# Patient Record
Sex: FEMALE | Race: WHITE | NOT HISPANIC OR LATINO | URBAN - METROPOLITAN AREA
[De-identification: names, ages, dates, MRNs, and addresses within clinical notes are randomized per-mention and may not be internally consistent; named-entity substitution may affect disease eponyms.]

---

## 2020-07-28 ENCOUNTER — APPOINTMENT (RX ONLY)
Dept: URBAN - METROPOLITAN AREA CLINIC 15 | Facility: CLINIC | Age: 30
Setting detail: DERMATOLOGY
End: 2020-07-28

## 2020-07-28 DIAGNOSIS — Z41.9 ENCOUNTER FOR PROCEDURE FOR PURPOSES OTHER THAN REMEDYING HEALTH STATE, UNSPECIFIED: ICD-10-CM

## 2020-07-28 PROCEDURE — ? DYSPORT

## 2020-07-28 PROCEDURE — ? MEDICAL CONSULTATION: DYNAMIC RHYTIDES

## 2020-07-28 PROCEDURE — ? FILLERS

## 2020-07-28 PROCEDURE — ? MEDICAL CONSULTATION: FILLERS

## 2020-07-28 ASSESSMENT — LOCATION SIMPLE DESCRIPTION DERM
LOCATION SIMPLE: LEFT CHEEK
LOCATION SIMPLE: RIGHT CHEEK
LOCATION SIMPLE: LEFT LIP
LOCATION SIMPLE: RIGHT LIP

## 2020-07-28 ASSESSMENT — LOCATION DETAILED DESCRIPTION DERM
LOCATION DETAILED: RIGHT SUPERIOR CENTRAL MALAR CHEEK
LOCATION DETAILED: LEFT CENTRAL MALAR CHEEK
LOCATION DETAILED: LEFT INFERIOR VERMILION LIP
LOCATION DETAILED: RIGHT CENTRAL MALAR CHEEK
LOCATION DETAILED: RIGHT SUPERIOR VERMILION LIP

## 2020-07-28 ASSESSMENT — LOCATION ZONE DERM
LOCATION ZONE: LIP
LOCATION ZONE: FACE

## 2020-07-28 NOTE — PROCEDURE: FILLERS
Vermilion Lips Filler Volume In Cc: 0
Additional Area 2 Location: glabella
Filler: Restylane Kysse
Include Cannula Brand?: [employer]
Lot #: [id number]
Additional Area 3 Location: chin
Price (Use Numbers Only, No Special Characters Or $): 0048 [employer]
Include Cannula Size?: 25G
Expiration Date (Month Year): 2021-03-21
Use Map Statement For Sites (Optional): No
Include Cannula Information In Note?: Yes
Include Cannula Length?: 1.5 inch
Lot #: [id number]
Vermilion Lips Filler Volume In Cc: 1
Anesthesia Type: 1% lidocaine with epinephrine
Map Statment: See [address] for Complete Details
Lot #: [id number]
Additional Area 1 Location: lips
Anesthesia Volume In Cc: 0.5
Include Cannula Length?: 1 inch
Additional Anesthesia Volume In Cc: 6
Post-Care Instructions: Patient instructed to apply ice to reduce swelling.
Additional Area 4 Location: neck
Consent: Written consent obtained. Risks include but not limited to bruising, beading, irregular texture, ulceration, infection, allergic reaction, scar formation, incomplete augmentation, temporary nature, procedural pain.
Expiration Date (Month Year): 2021-04-17
Additional Area 1 Location: GL
Detail Level: Detailed
Lot #: [id number]
Price (Use Numbers Only, No Special Characters Or $): Monique
Filler: Wolf Monteiro
Map Statment: See Attach Map for Complete Details
Cheeks Filler Volume In Cc: 2
Lot #: [id number]
Lot #: [id number]

## 2020-08-07 ENCOUNTER — APPOINTMENT (RX ONLY)
Dept: URBAN - METROPOLITAN AREA CLINIC 15 | Facility: CLINIC | Age: 30
Setting detail: DERMATOLOGY
End: 2020-08-07

## 2020-08-07 DIAGNOSIS — Z41.9 ENCOUNTER FOR PROCEDURE FOR PURPOSES OTHER THAN REMEDYING HEALTH STATE, UNSPECIFIED: ICD-10-CM

## 2020-08-07 PROCEDURE — ? COSMETIC FOLLOW-UP

## 2020-08-07 PROCEDURE — ? IPL COSMETIC

## 2020-08-07 ASSESSMENT — LOCATION DETAILED DESCRIPTION DERM
LOCATION DETAILED: LEFT INFERIOR CENTRAL MALAR CHEEK
LOCATION DETAILED: RIGHT INFERIOR MEDIAL MALAR CHEEK

## 2020-08-07 ASSESSMENT — LOCATION ZONE DERM: LOCATION ZONE: FACE

## 2020-08-07 ASSESSMENT — LOCATION SIMPLE DESCRIPTION DERM
LOCATION SIMPLE: LEFT CHEEK
LOCATION SIMPLE: RIGHT CHEEK

## 2020-08-07 NOTE — PROCEDURE: IPL COSMETIC
Fluence (Include Units): 99[address] [facility]
Pulse Energy (Include Units): 5727 [facility]
Coolin
Detail Level: Zone
End-Point And Post-Procedure Care: The procedure continued until mild purpura was noted. Immediately following the procedure, Vaseline and ice applied. Post care reviewed with patient.
Consent: Prior to the procedure consent obtained, risks reviewed including but not limited to crusting, scabbing, blistering, scarring, darker or lighter pigmentary change, incidental hair removal, bruising, and/or incomplete removal.
Beam Overlap/Density (Include Units): 30%
Number Of Passes: 2
Eye Protection: Laser Aid
Indication: PIE
Pre-Procedure Care: Prior to the procedure the patient and all present had protective eyewear in place and a warning sign was placed on the door.
Number Of Passes: 1
Post-Care Instructions: I reviewed with the patient in detail post-care instructions. Patient should stay away from the sun and wear sun protection until treated areas are fully healed.
Show Price In Visit Note: No
Pulse Duration (Include Units): 10ms
Price (Use Numbers Only, No Special Characters Or $): 333 [facility]
Fluence (Include Units): 32
Pulse Duration (Include Units): 10
Ipl Type: Justin Savage

## 2020-08-07 NOTE — PROCEDURE: COSMETIC FOLLOW-UP
Patient/Caregiver provided printed discharge information.
Global Improvement: Very Good
Side Effects Or Complications: None
Patient Satisfaction: Very pleased
Detail Level: Zone
Treatment (Optional): Dysport

## 2021-02-01 NOTE — PROCEDURE: DYSPORT
Dilution (U/ 0.1cc): 1
R Brow Units: 0
Show Anterior Platysmal Band Units: Yes
anemia
Show Ucl Units: No
Post-Care Instructions: Patient instructed to not lie down for 4 hours and limit physical activity for 24 hours. Patient instructed not to travel by airplane for 48 hours.
Price (Use Numbers Only, No Special Characters Or $): 333 [facility]
Additional Area 3 Location: bunny lines
Glabellar Complex Units: 6216 [facility]
Abnormal EKG
Detail Level: Detailed
Periorbital Skin Units: 20
Additional Area 2 Location: chin
Lot #: [id number]
Consent: Written consent obtained. Risks include but not limited to lid/brow ptosis, bruising, swelling, diplopia, temporary effect, incomplete chemical denervation.
Expiration Date (Month Year): 11/30/2020
Forehead Units: 4
Additional Area 1 Location: Left upper lip

## 2022-02-18 ENCOUNTER — APPOINTMENT (RX ONLY)
Dept: URBAN - METROPOLITAN AREA CLINIC 15 | Facility: CLINIC | Age: 32
Setting detail: DERMATOLOGY
End: 2022-02-18

## 2022-02-18 DIAGNOSIS — Z41.9 ENCOUNTER FOR PROCEDURE FOR PURPOSES OTHER THAN REMEDYING HEALTH STATE, UNSPECIFIED: ICD-10-CM

## 2022-02-18 PROCEDURE — ? MEDICAL CONSULTATION: FRACTIONAL RESURFACING

## 2022-02-18 PROCEDURE — ? ADDITIONAL NOTES

## 2022-02-18 PROCEDURE — ? FRAXEL

## 2022-02-18 ASSESSMENT — LOCATION ZONE DERM: LOCATION ZONE: FACE

## 2022-02-18 ASSESSMENT — LOCATION DETAILED DESCRIPTION DERM
LOCATION DETAILED: RIGHT CENTRAL MALAR CHEEK
LOCATION DETAILED: SUPERIOR MID FOREHEAD
LOCATION DETAILED: LEFT CHIN
LOCATION DETAILED: LEFT INFERIOR CENTRAL MALAR CHEEK

## 2022-02-18 ASSESSMENT — LOCATION SIMPLE DESCRIPTION DERM
LOCATION SIMPLE: LEFT CHEEK
LOCATION SIMPLE: CHIN
LOCATION SIMPLE: SUPERIOR FOREHEAD
LOCATION SIMPLE: RIGHT CHEEK

## 2022-02-18 NOTE — PROCEDURE: ADDITIONAL NOTES
Detail Level: Detailed
Render Risk Assessment In Note?: no
Additional Notes: Recommend Alastin skin nectar and hydroquinone 8% cream nightly

## 2022-02-18 NOTE — PROCEDURE: FRAXEL
Was An Eye Shield Used?: No
External Cooling: SmartCool
Treatment Number: 1
External Cooling Fan Speed: 5
Topical Anesthesia Type: BLT cream (benzocaine 20%, lidocaine 6%, tetracaine 4%)
Small Metal Eye Shield Text: The ocular mucosa was anesthetized with tetracaine. Once adequate anesthesia was optained, small metal eye shields were inserted and remained in place until the procedure was completed.
Medium Metal Eye Shield Text: The ocular mucosa was anesthetized with tetracaine. Once adequate anesthesia was optained, medium metal eye shields were inserted and remained in place until the procedure was completed.
Price (Use Numbers Only, No Special Characters Or $): Brendan Campuzano
Wavelength: 1550nm
Location: Use Location Override
Indication: resurfacing
Detail Level: Zone
Large Metal Eye Shield Text: The ocular mucosa was anesthetized with tetracaine. Once adequate anesthesia was optained, large metal eye shields were inserted and remained in place until the procedure was completed.
Small Plastic Eye Shield Text: The ocular mucosa was anesthetized with tetracaine. Once adequate anesthesia was optained, small plastic eye shields were inserted and remained in place until the procedure was completed.
Medium Plastic Eye Shield Text: The ocular mucosa was anesthetized with tetracaine. Once adequate anesthesia was optained, medium plastic eye shields were inserted and remained in place until the procedure was completed.
Large Plastic Eye Shield Text: The ocular mucosa was anesthetized with tetracaine. Once adequate anesthesia was optained, large plastic eye shields were inserted and remained in place until the procedure was completed.
Total Coverage: 20%
Number Of Passes: 2
Depth In Microns (Use Numbers Only, No Special Characters Or $): [address]
Location: lower face
Energy(Mj/Cm2): [address] [facility]
Post-Care Instructions: I reviewed with the patient in detail post-care instructions. Patient should avoid sun until area fully healed. \\nRecommend Alastin Skin Nectar BID and Mineral Sunblock until next procedure
Total Coverage: 17%
Treatment Level: 6
Energy(Mj/Cm2): 35
Energy(Mj/Cm2): [address]
Location: full face
Treatment Level: 8
Depth In Microns (Use Numbers Only, No Special Characters Or $): 2189
Treatment Level: 7
Pre-Procedure Text (Will Appear After Anesthesia Text): Discussed tx and post procedure recovery.  Quote given
Consent: Written consent obtained, risks reviewed including but not limited to pain and incomplete improvement.

## 2023-01-24 ENCOUNTER — APPOINTMENT (RX ONLY)
Dept: URBAN - METROPOLITAN AREA CLINIC 15 | Facility: CLINIC | Age: 33
Setting detail: DERMATOLOGY
End: 2023-01-24

## 2023-01-24 DIAGNOSIS — Z41.9 ENCOUNTER FOR PROCEDURE FOR PURPOSES OTHER THAN REMEDYING HEALTH STATE, UNSPECIFIED: ICD-10-CM

## 2023-01-24 PROCEDURE — ? PRESCRIPTION

## 2023-01-24 PROCEDURE — ? RECOMMENDATIONS

## 2023-01-24 PROCEDURE — ? PICOWAY LASER

## 2023-01-24 RX ORDER — PHARMACY COMPOUNDING ACCESSORY
EACH MISCELLANEOUS QHS
Qty: 30 | Refills: 0 | Status: CANCELLED | COMMUNITY
Start: 2023-01-24

## 2023-01-24 RX ORDER — TRETIONIN 0.25 MG/G
1 CREAM TOPICAL
Qty: 20 | Refills: 3 | Status: ERX | COMMUNITY
Start: 2023-01-24

## 2023-01-24 RX ADMIN — TRETIONIN 1: 0.25 CREAM TOPICAL at 00:00

## 2023-01-24 RX ADMIN — Medication: at 00:00

## 2023-01-24 NOTE — PROCEDURE: PICOWAY LASER
Detail Level: Simple
Handpiece: Resolve
Frequency (Hz): 8Hz
Spot Size: 6.0 mm
Wavelength: 532 nm
Treatment Number: 0
Fluence (J/Cm2): 2
Pulse Duration: 2.5 ms
Fluence (J/Cm2): 0.6
Consent: Written consent obtained, risks reviewed including but not limited to crusting, scabbing, blistering, scarring, darker or lighter pigmentary change, paradoxical hair regrowth, incomplete removal of hair and infection.
Device Serial Number (Optional): [id number]
Pre-Procedure: Prior to proceeding the treatment areas were cleaned and all present put on their eye protection.
Topical Anesthesia Type: BLT ointment (benzocaine 20%, lidocaine 10%, tetracaine 4%)
Length Of Topical Anesthesia Application (Optional): 60 minutes
Spot Size: 4.0 mm
Handpiece: Zoom
Fluence (J/Cm2): 0.65
Post-Care Instructions: I reviewed with the patient in detail post-care instructions. Patient should avoid sun for a minimum of 4 weeks before and after treatment.
Frequency (Hz): 6Hz
Price (Use Numbers Only, No Special Characters Or $): [address] [facility],[address]
Hide Pulse Duration?: No
Spot Size: 2.0 mm
Post-Procedure Care: Immediate endpoint: perifollicular erythema and edema. ice applied. Post care reviewed with patient.
Treatment Number: 1
Fluence (J/Cm2): 1.1
Spot Size: 8.0 mm
Wavelength: 1064 nm

## 2023-01-24 NOTE — PROCEDURE: RECOMMENDATIONS
Render Risk Assessment In Note?: no
Recommendations (Free Text): Am\\nWash ace with gentle cleanser\\nApply vitamin C \\nApply even tone correction serum. In office product from [employer] \\nApply moisturizer \\nApply sunscreen \\nPm\\nWash\\nApply compound cream 8%HQ. [facility] \\nApply Tretinoin 0.025% cream . Start 3 x a week. Increase as tolerated.  Rx sent \\nApply moisturizer
Detail Level: Zone

## 2023-01-24 NOTE — HPI: COSMETIC CONSULTATION
Additional History: Patient was consulted last year and was recommended IPL treatments.  She wants to reevaluate the area for melasma

## 2023-01-26 ENCOUNTER — RX ONLY (OUTPATIENT)
Age: 33
Setting detail: RX ONLY
End: 2023-01-26

## 2023-01-26 RX ORDER — PHARMACY COMPOUNDING ACCESSORY
1 EACH MISCELLANEOUS QHS
Qty: 60 | Refills: 0 | Status: ERX | COMMUNITY
Start: 2023-01-26

## 2023-03-02 ENCOUNTER — APPOINTMENT (RX ONLY)
Dept: URBAN - METROPOLITAN AREA CLINIC 15 | Facility: CLINIC | Age: 33
Setting detail: DERMATOLOGY
End: 2023-03-02

## 2023-03-02 DIAGNOSIS — Z41.9 ENCOUNTER FOR PROCEDURE FOR PURPOSES OTHER THAN REMEDYING HEALTH STATE, UNSPECIFIED: ICD-10-CM

## 2023-03-02 PROCEDURE — ? RECOMMENDATIONS

## 2023-03-02 PROCEDURE — ? PICOWAY LASER

## 2023-03-02 NOTE — PROCEDURE: PICOWAY LASER
Detail Level: Simple
Handpiece: Resolve
Frequency (Hz): 8Hz
Passes: 2
Spot Size: 6.0 mm
Wavelength: 532 nm
Treatment Number: 0
Pulse Duration: 2.5 ms
Fluence (J/Cm2): 0.4
Consent: Written consent obtained, risks reviewed including but not limited to crusting, scabbing, blistering, scarring, darker or lighter pigmentary change, paradoxical hair regrowth, incomplete removal of hair and infection.
Device Serial Number (Optional): [id number]
Wavelength: 1064 nm
Pre-Procedure: Prior to proceeding the treatment areas were cleaned and all present put on their eye protection.
Topical Anesthesia Type: BLT ointment (benzocaine 20%, lidocaine 10%, tetracaine 4%)
Length Of Topical Anesthesia Application (Optional): 60 minutes
Spot Size: 6.0 mm x 6.0 mm
Handpiece: Zoom
Fluence (J/Cm2): 0.35
Handpiece: Fusion
Post-Care Instructions: I reviewed with the patient in detail post-care instructions. Patient should avoid sun for a minimum of 4 weeks before and after treatment.
Frequency (Hz): 6Hz
Price (Use Numbers Only, No Special Characters Or $): 8[address] [facility],[address]
Hide Pulse Duration?: No
Spot Size: 2.0 mm
Post-Procedure Care: Immediate endpoint: perifollicular erythema and edema. ice applied. Post care reviewed with patient.
Fluence (J/Cm2): 1.3
Spot Size: 3.0 mm

## 2023-04-05 ENCOUNTER — APPOINTMENT (RX ONLY)
Dept: URBAN - METROPOLITAN AREA CLINIC 23 | Facility: CLINIC | Age: 33
Setting detail: DERMATOLOGY
End: 2023-04-05

## 2023-04-05 DIAGNOSIS — Z41.9 ENCOUNTER FOR PROCEDURE FOR PURPOSES OTHER THAN REMEDYING HEALTH STATE, UNSPECIFIED: ICD-10-CM

## 2023-04-05 PROCEDURE — ? RECOMMENDATIONS

## 2023-04-05 PROCEDURE — ? PULSED-DYE LASER

## 2023-04-05 NOTE — PROCEDURE: PULSED-DYE LASER
Detail Level: Zone
Fluence In J/Cm2 (Optional): 8
Price (Use Numbers Only, No Special Characters Or $): [address]
Delay Time (Ms): 9032 [facility]
Delay Time (Ms): 20
Post-Care Instructions: I reviewed with the patient in detail post-care instructions. Patient should stay away from the sun and wear sun protection until treated areas are fully healed.
Cryogen Time (Ms): [address] Derrell Napoles
Cryogen Time (Ms): 30
Spot Size: 7 mm
Spot Size: 10 mm
Location Override: face
Pulse Duration: 10 ms
Laser Type: Vbeam 595nm
Post-Procedure Care: Vaseline and ice applied. Post care reviewed with patient.
Consent: Written consent obtained, risks reviewed including but not limited to crusting, scabbing, blistering, scarring, darker or lighter pigmentary change, incidental hair removal, bruising, and/or incomplete removal.

## 2023-04-05 NOTE — PROCEDURE: RECOMMENDATIONS
Recommendations (Free Text): Recommended 3-5 sessions for redness from rosacea
Render Risk Assessment In Note?: no
Detail Level: Zone

## 2023-05-11 ENCOUNTER — APPOINTMENT (RX ONLY)
Dept: URBAN - METROPOLITAN AREA CLINIC 15 | Facility: CLINIC | Age: 33
Setting detail: DERMATOLOGY
End: 2023-05-11

## 2023-05-11 DIAGNOSIS — Z41.9 ENCOUNTER FOR PROCEDURE FOR PURPOSES OTHER THAN REMEDYING HEALTH STATE, UNSPECIFIED: ICD-10-CM

## 2023-05-11 PROCEDURE — ? PICOWAY LASER

## 2023-05-11 PROCEDURE — ? RECOMMENDATIONS

## 2023-05-11 NOTE — PROCEDURE: PICOWAY LASER
Detail Level: Simple
Handpiece: Resolve
Frequency (Hz): 8Hz
Passes: 2
Spot Size: 6.0 mm
Wavelength: 532 nm
Treatment Number: 0
Pulse Duration: 2.5 ms
Fluence (J/Cm2): 0.8
Consent: Written consent obtained, risks reviewed including but not limited to crusting, scabbing, blistering, scarring, darker or lighter pigmentary change, paradoxical hair regrowth, incomplete removal of hair and infection.
Device Serial Number (Optional): [id number]
Wavelength: 1064 nm
Treatment Number: 4
Pre-Procedure: Prior to proceeding the treatment areas were cleaned and all present put on their eye protection.
Topical Anesthesia Type: BLT ointment (benzocaine 20%, lidocaine 10%, tetracaine 4%)
Length Of Topical Anesthesia Application (Optional): 60 minutes
Spot Size: 6.0 mm x 6.0 mm
Handpiece: Zoom
Fluence (J/Cm2): 0.45
Handpiece: Fusion
Post-Care Instructions: I reviewed with the patient in detail post-care instructions. Patient should avoid sun for a minimum of 4 weeks before and after treatment.
Price (Use Numbers Only, No Special Characters Or $): [address] [facility],[address]
Hide Pulse Duration?: No
Spot Size: 2.0 mm
Post-Procedure Care: Immediate endpoint: perifollicular erythema and edema. ice applied. Post care reviewed with patient.
Fluence (J/Cm2): 1.9
Spot Size: 8.0 mm

## 2023-06-14 ENCOUNTER — APPOINTMENT (RX ONLY)
Dept: URBAN - METROPOLITAN AREA CLINIC 23 | Facility: CLINIC | Age: 33
Setting detail: DERMATOLOGY
End: 2023-06-14

## 2023-06-14 DIAGNOSIS — Z41.9 ENCOUNTER FOR PROCEDURE FOR PURPOSES OTHER THAN REMEDYING HEALTH STATE, UNSPECIFIED: ICD-10-CM

## 2023-06-14 PROCEDURE — ? PULSED-DYE LASER

## 2023-06-14 NOTE — PROCEDURE: PULSED-DYE LASER
Spot Size: 7 mm
Delay Time (Ms): 2508 [facility]
Delay Time (Ms): 20
Location (Required For Details To Render In Note But Body Touch Will Also Count For First Location): full face
Pulse Duration: 10 ms
Fluence In J/Cm2 (Optional): 8:00
Cryogen Time (Ms): [address] Derrell Napoles
Post-Procedure Care: Vaseline and ice applied. Post care reviewed with patient.
Price (Use Numbers Only, No Special Characters Or $): 350.00
Post-Care Instructions: I reviewed with the patient in detail post-care instructions. Patient should stay away from the sun and wear sun protection until treated areas are fully healed.
Consent: Written consent obtained, risks reviewed including but not limited to crusting, scabbing, blistering, scarring, darker or lighter pigmentary change, incidental hair removal, bruising, and/or incomplete removal.
Cryogen Time (Ms): 30
Fluence In J/Cm2 (Optional): 8
Detail Level: Zone
Cryogen Time (Ms): 10
Laser Type: Vbeam 595nm
Delay Time (Ms): 21
Spot Size: 10 mm

## 2023-07-28 ENCOUNTER — RX ONLY (OUTPATIENT)
Age: 33
Setting detail: RX ONLY
End: 2023-07-28

## 2023-07-28 ENCOUNTER — APPOINTMENT (RX ONLY)
Dept: URBAN - METROPOLITAN AREA CLINIC 23 | Facility: CLINIC | Age: 33
Setting detail: DERMATOLOGY
End: 2023-07-28

## 2023-07-28 DIAGNOSIS — Z41.9 ENCOUNTER FOR PROCEDURE FOR PURPOSES OTHER THAN REMEDYING HEALTH STATE, UNSPECIFIED: ICD-10-CM

## 2023-07-28 PROCEDURE — ? PULSED-DYE LASER

## 2023-07-28 RX ORDER — PHARMACY COMPOUNDING ACCESSORY
1 EACH MISCELLANEOUS QHS
Qty: 60 | Refills: 2 | Status: ERX

## 2023-07-28 NOTE — PROCEDURE: PULSED-DYE LASER
Fluence In J/Cm2 (Optional): 8.00
Location Override: full face
Pulse Duration: 10 ms
Pulse Duration: 20 ms
Cryogen Time (Ms): [address] Derrell Napoles
Cryogen Time (Ms): 30
Post-Procedure Care: Vaseline and ice applied. Post care reviewed with patient.
Consent: Written consent obtained, risks reviewed including but not limited to crusting, scabbing, blistering, scarring, darker or lighter pigmentary change, incidental hair removal, bruising, and/or incomplete removal.
Spot Size: 7 mm
Spot Size: 10 mm
Treated Area: small area
Delay Time (Ms): 0293 [facility]
Fluence In J/Cm2 (Optional): 9
Post-Care Instructions: I reviewed with the patient in detail post-care instructions. Patient should stay away from the sun and wear sun protection until treated areas are fully healed.
Detail Level: Detailed
Price (Use Numbers Only, No Special Characters Or $): 500.00
Delay Time (Ms): 20
Location Override: facial telangiectasias (nose, cheeks)
Cryogen Time (Ms): 27
Laser Type: Vbeam 595nm

## 2023-09-07 ENCOUNTER — APPOINTMENT (RX ONLY)
Dept: URBAN - METROPOLITAN AREA CLINIC 23 | Facility: CLINIC | Age: 33
Setting detail: DERMATOLOGY
End: 2023-09-07

## 2023-09-07 DIAGNOSIS — Z41.9 ENCOUNTER FOR PROCEDURE FOR PURPOSES OTHER THAN REMEDYING HEALTH STATE, UNSPECIFIED: ICD-10-CM

## 2023-09-07 PROCEDURE — ? PICOWAY LASER

## 2023-09-07 PROCEDURE — ? PULSED-DYE LASER

## 2023-09-07 NOTE — PROCEDURE: PICOWAY LASER
Spot Size: 6.0 mm
Fluence (J/Cm2): 2
Treatment Number: 3
Post-Care Instructions: I reviewed with the patient in detail post-care instructions. Patient should avoid sun for a minimum of 4 weeks before and after treatment.
Fluence (J/Cm2): 1.3
Post-Procedure Care: Immediate endpoint: mild erythema. Vaseline and ice applied. Post care reviewed with patient.
Pulse Duration: 2.5 ms
Location Override: lateral cheeks
Spot Size: 6.0 mm x 6.0 mm
Wavelength: 1064 nm
Treatment Number: 0
Detail Level: Detailed
Hide Pulse Duration?: Yes
Wavelength: 532 nm
Anesthesia Type: 1% lidocaine with epinephrine
Handpiece: Resolve
Fluence (J/Cm2): 0.8
Spot Size: 2.0 mm
Frequency (Hz): Zonia Smith
Fluence (J/Cm2): 0.4
Handpiece: Zoom
Frequency (Hz): Arianna Mosquera
Price (Use Numbers Only, No Special Characters Or $): 700.00
Pre-Procedure: Prior to proceeding the treatment areas were cleaned and all present put on their eye protection.
Consent: Written consent obtained, risks reviewed including but not limited to crusting, scabbing, blistering, scarring, darker or lighter pigmentary change, paradoxical hair regrowth, incomplete removal of hair and infection.
Frequency (Hz): 6Hz

## 2023-11-27 ENCOUNTER — APPOINTMENT (OUTPATIENT)
Dept: URBAN - METROPOLITAN AREA CLINIC 227 | Age: 33
Setting detail: DERMATOLOGY
End: 2023-11-28

## 2023-11-27 DIAGNOSIS — L71.8 OTHER ROSACEA: ICD-10-CM

## 2023-11-27 PROCEDURE — OTHER MIPS QUALITY: OTHER

## 2023-11-27 PROCEDURE — OTHER PULSED-DYE LASER: OTHER

## 2023-11-27 PROCEDURE — OTHER ADDITIONAL NOTES: OTHER

## 2023-11-27 PROCEDURE — OTHER COUNSELING: OTHER

## 2023-11-27 PROCEDURE — OTHER COSMETIC CONSULTATION - PULSED-DYE LASER: OTHER

## 2023-11-27 PROCEDURE — OTHER COSMETIC CONSULTATION: PULSED-DYE LASER: OTHER

## 2023-11-27 ASSESSMENT — LOCATION SIMPLE DESCRIPTION DERM
LOCATION SIMPLE: RIGHT CHEEK
LOCATION SIMPLE: LEFT CHEEK

## 2023-11-27 ASSESSMENT — LOCATION DETAILED DESCRIPTION DERM
LOCATION DETAILED: LEFT CENTRAL MALAR CHEEK
LOCATION DETAILED: RIGHT MEDIAL MALAR CHEEK

## 2023-11-27 ASSESSMENT — LOCATION ZONE DERM: LOCATION ZONE: FACE

## 2023-11-27 NOTE — PROCEDURE: MIPS QUALITY
Quality 226: Preventive Care And Screening: Tobacco Use: Screening And Cessation Intervention: Patient screened for tobacco use and is an ex/non-smoker
Quality 130: Documentation Of Current Medications In The Medical Record: Current Medications Documented
Quality 431: Preventive Care And Screening: Unhealthy Alcohol Use - Screening: Patient not identified as an unhealthy alcohol user when screened for unhealthy alcohol use using a systematic screening method
Quality 110: Preventive Care And Screening: Influenza Immunization: Influenza Immunization previously received during influenza season
Detail Level: Zone

## 2023-11-27 NOTE — PROCEDURE: PULSED-DYE LASER
Location (Required For Details To Render In Note But Body Touch Will Also Count For First Location): full face
Post-Procedure Care: Vaseline applied. Post care reviewed with patient.
Pulse Duration: 10 ms
Treated Area: large area
Cryogen Time (Ms): 30
Delay Time (Ms): 20
Spot Size: 7 mm
Fluence In J/Cm2 (Optional): 8.5
Post-Care Instructions: I reviewed with the patient in detail post-care instructions. Patient should apply ice to the face throughout the day and stay away from the sun.  Tomorrow it is okay to apply skin care products unless skin is irritated or sore.  Avoid scrubbing, exfoliation, retinoids until treated areas are fully healed.
Detail Level: Zone
Consent: Written consent obtained, risks reviewed including but not limited to swelling, bruising, redness, and rarely, scabbing, blistering, scarring,  darker or lighter pigmentary change, incidental hair removal, and/or incomplete removal.
Price (Use Numbers Only, No Special Characters Or $): 400
Laser Type: Vbeam 595nm
Immediate Endpoint: erythema

## 2023-11-27 NOTE — HPI: RASH (ROSACEA)
How Severe Is Your Rosacea?: moderate
Is This A New Presentation, Or A Follow-Up?: Rosacea
Additional History: Currently using compound of Metronidazole, Ivermectin and azelaic acid.\\n\\nSays she would like laser treatment done today.

## 2023-11-27 NOTE — PROCEDURE: ADDITIONAL NOTES
Additional Notes: Has had 2 treatments for Rosacea before with excellent results, and minimal bruising or swelling.
Detail Level: Simple
Render Risk Assessment In Note?: yes

## 2023-12-12 ENCOUNTER — APPOINTMENT (RX ONLY)
Dept: URBAN - METROPOLITAN AREA CLINIC 15 | Facility: CLINIC | Age: 33
Setting detail: DERMATOLOGY
End: 2023-12-12

## 2023-12-12 DIAGNOSIS — Z41.9 ENCOUNTER FOR PROCEDURE FOR PURPOSES OTHER THAN REMEDYING HEALTH STATE, UNSPECIFIED: ICD-10-CM

## 2023-12-12 PROCEDURE — ? PICOWAY LASER

## 2023-12-12 NOTE — PROCEDURE: PICOWAY LASER
Spot Size: 6.0 mm
Fluence (J/Cm2): 2
Treatment Number: 5
Post-Care Instructions: I reviewed with the patient in detail post-care instructions. Patient should avoid sun for a minimum of 4 weeks before and after treatment.
Fluence (J/Cm2): 1.5
Post-Procedure Care: Immediate endpoint: mild erythema. Vaseline and ice applied. Post care reviewed with patient.
Pulse Duration: 2.5 ms
Location Override: lateral cheeks
Spot Size: 6.0 mm x 6.0 mm
Wavelength: 1064 nm
Treatment Number: 0
Detail Level: Detailed
Hide Pulse Duration?: Yes
Wavelength: 532 nm
Anesthesia Type: 1% lidocaine with epinephrine
Handpiece: Resolve
Fluence (J/Cm2): 0.75
Spot Size: 2.0 mm
Frequency (Hz): 6hz
Fluence (J/Cm2): 0.4
Handpiece: Zoom
Frequency (Hz): 5hz
Price (Use Numbers Only, No Special Characters Or $): 700.00
Pre-Procedure: Prior to proceeding the treatment areas were cleaned and all present put on their eye protection.
Consent: Written consent obtained, risks reviewed including but not limited to crusting, scabbing, blistering, scarring, darker or lighter pigmentary change, paradoxical hair regrowth, incomplete removal of hair and infection.

## 2024-04-02 ENCOUNTER — APPOINTMENT (RX ONLY)
Dept: URBAN - METROPOLITAN AREA CLINIC 23 | Facility: CLINIC | Age: 34
Setting detail: DERMATOLOGY
End: 2024-04-02

## 2024-04-02 DIAGNOSIS — Z41.9 ENCOUNTER FOR PROCEDURE FOR PURPOSES OTHER THAN REMEDYING HEALTH STATE, UNSPECIFIED: ICD-10-CM

## 2024-04-02 PROCEDURE — ? PICOWAY LASER

## 2024-04-02 NOTE — PROCEDURE: PICOWAY LASER
Handpiece: Resolve
Treatment Number: 6
Fluence (J/Cm2): 1.5
Hide Pulse Duration?: No
Frequency (Hz): 4Hz
Handpiece: Zoom
External Cooling Fan Speed: 0
Spot Size: 6.0 mm x 6.0 mm
Post-Care Instructions: I reviewed with the patient in detail post-care instructions. Patient should avoid sun for a minimum of 4 weeks before and after treatment.
Pulse Duration: 2.5 ms
Wavelength: 1064 nm
Spot Size: 6.0 mm
Pre-Procedure: Prior to proceeding the treatment areas were cleaned and all present put on their eye protection.
Price (Use Numbers Only, No Special Characters Or $): 700.00
Wavelength: 532 nm
Spot Size: 2.0 mm
Passes: 4
Location Override: Brown Spots
Post-Procedure Care: Immediate endpoint: perifollicular erythema and edema. Vaseline and ice applied. Post care reviewed with patient.
Location Override: Full face
Consent: Written consent obtained, risks reviewed including but not limited to crusting, scabbing, blistering, scarring, darker or lighter pigmentary change, paradoxical hair regrowth, incomplete removal of hair and infection.
Detail Level: Detailed
Anesthesia Type: 1% lidocaine with epinephrine
Fluence (J/Cm2): 0.8
Fluence (J/Cm2): 2

## 2024-05-06 ENCOUNTER — APPOINTMENT (OUTPATIENT)
Dept: URBAN - METROPOLITAN AREA CLINIC 227 | Age: 34
Setting detail: DERMATOLOGY
End: 2024-05-07

## 2024-05-06 DIAGNOSIS — L71.8 OTHER ROSACEA: ICD-10-CM

## 2024-05-06 PROCEDURE — OTHER PULSED-DYE LASER: OTHER

## 2024-05-06 PROCEDURE — OTHER COUNSELING: OTHER

## 2024-05-06 ASSESSMENT — LOCATION SIMPLE DESCRIPTION DERM
LOCATION SIMPLE: CHEST
LOCATION SIMPLE: INFERIOR FOREHEAD

## 2024-05-06 ASSESSMENT — LOCATION ZONE DERM
LOCATION ZONE: TRUNK
LOCATION ZONE: FACE

## 2024-05-06 ASSESSMENT — LOCATION DETAILED DESCRIPTION DERM
LOCATION DETAILED: UPPER STERNUM
LOCATION DETAILED: INFERIOR MID FOREHEAD

## 2024-05-06 NOTE — PROCEDURE: PULSED-DYE LASER
Spot Size: 7 mm
Immediate Endpoint: purpura
Spot Size: 10 mm
Delay Time (Ms): 20
Laser Type: Vbeam 595nm
Fluence In J/Cm2 (Optional): 8.5
Consent: Written consent obtained, risks reviewed including but not limited to swelling, bruising, redness, and rarely, scabbing, blistering, scarring,  darker or lighter pigmentary change, incidental hair removal, and/or incomplete removal.
Cryogen Time (Ms): 30
Pulse Duration: 10 ms
Location Override: chest
Location (Required For Details To Render In Note But Body Touch Will Also Count For First Location): full face
Treated Area: small area
Post-Care Instructions: I reviewed with the patient in detail post-care instructions. Patient should apply ice to the face throughout the day and stay away from the sun.  Tomorrow it is okay to apply skin care products unless skin is irritated or sore.  Avoid scrubbing, exfoliation, retinoids until treated areas are fully healed.
Post-Procedure Care: Vaseline applied. Post care reviewed with patient.
Detail Level: Detailed
Price (Use Numbers Only, No Special Characters Or $): 789

## 2024-05-16 ENCOUNTER — APPOINTMENT (RX ONLY)
Dept: URBAN - METROPOLITAN AREA CLINIC 23 | Facility: CLINIC | Age: 34
Setting detail: DERMATOLOGY
End: 2024-05-16

## 2024-05-16 DIAGNOSIS — Z41.9 ENCOUNTER FOR PROCEDURE FOR PURPOSES OTHER THAN REMEDYING HEALTH STATE, UNSPECIFIED: ICD-10-CM

## 2024-05-16 PROCEDURE — ? PICOWAY LASER

## 2024-05-16 PROCEDURE — ? ADDITIONAL NOTES

## 2024-05-16 NOTE — PROCEDURE: PICOWAY LASER
Handpiece: Resolve
Treatment Number: 6
Fluence (J/Cm2): 1.5
Hide Pulse Duration?: No
Frequency (Hz): 4
Handpiece: Zoom
External Cooling Fan Speed: 0
Spot Size: 6.0 mm x 6.0 mm
Post-Care Instructions: I reviewed with the patient in detail post-care instructions. Patient should avoid sun for a minimum of 4 weeks before and after treatment.
Pulse Duration: 2.5 ms
Wavelength: 1064 nm
Spot Size: 6.0 mm
Pre-Procedure: Prior to proceeding the treatment areas were cleaned and all present put on their eye protection.
Price (Use Numbers Only, No Special Characters Or $): 700.00
Wavelength: 532 nm
Frequency (Hz): 4Hz
Spot Size: 2.0 mm
Location Override: melasma
Post-Procedure Care: Immediate endpoint: perifollicular erythema and edema. Vaseline and ice applied. Post care reviewed with patient.
Location Override: Full face
Consent: Written consent obtained, risks reviewed including but not limited to crusting, scabbing, blistering, scarring, darker or lighter pigmentary change, paradoxical hair regrowth, incomplete removal of hair and infection.
Detail Level: Detailed
Anesthesia Type: 1% lidocaine with epinephrine
Treatment Number: 7
Fluence (J/Cm2): 0.7
Fluence (J/Cm2): 2

## 2024-05-16 NOTE — PROCEDURE: ADDITIONAL NOTES
Additional Notes: Recommended Pavise SPF\\n\\nContinue tretinoin for 7 weeks - then will prescribe tri-emily qhs x 12 weeks
Render Risk Assessment In Note?: no
Detail Level: Simple

## 2024-06-13 ENCOUNTER — APPOINTMENT (RX ONLY)
Dept: URBAN - METROPOLITAN AREA CLINIC 23 | Facility: CLINIC | Age: 34
Setting detail: DERMATOLOGY
End: 2024-06-13

## 2024-06-13 DIAGNOSIS — Z41.9 ENCOUNTER FOR PROCEDURE FOR PURPOSES OTHER THAN REMEDYING HEALTH STATE, UNSPECIFIED: ICD-10-CM

## 2024-06-13 PROCEDURE — ? VBEAM PERFECTA LASER

## 2024-06-13 NOTE — PROCEDURE: VBEAM PERFECTA LASER
Location: cheeks and nose
Delay Time (Ms): 0
Fluence (J/Cm2): 7.00
Pre-Procedure: The treatment areas identified by the patient were cleansed and treatment was performed with the parameters mentioned above.
Is There A Third Setting?: no
Post-Procedure: Following the procedure the post care instructions were reviewed with the patient.
Spot Size: 10 mm
Consent: Written consent obtained.  Risks were reviewed including but not limited to crusting, scabbing, blistering, scarring, darker or lighter pigmentary change, bruising, and/or incomplete response.
Detail Level: Zone
Post-Care Instructions: I reviewed with the patient in detail post-care instructions.  Cool compresses or cold gel packs may be applied after treatment.  Exposure to the sun should be avoided and sun protection and sunscreen should be used.
Price (Use Numbers Only, No Special Characters Or $): 772
Pulse Duration: 10 ms

## 2024-07-18 ENCOUNTER — APPOINTMENT (RX ONLY)
Dept: URBAN - METROPOLITAN AREA CLINIC 23 | Facility: CLINIC | Age: 34
Setting detail: DERMATOLOGY
End: 2024-07-18

## 2024-07-18 DIAGNOSIS — Z41.9 ENCOUNTER FOR PROCEDURE FOR PURPOSES OTHER THAN REMEDYING HEALTH STATE, UNSPECIFIED: ICD-10-CM

## 2024-07-18 PROCEDURE — ? ADDITIONAL NOTES

## 2024-07-18 PROCEDURE — ? VBEAM PERFECTA LASER

## 2024-07-18 NOTE — PROCEDURE: ADDITIONAL NOTES
Detail Level: Simple
Additional Notes: Patient will begin hydroquinone 12%.\\nRecommended VI peel dark spot serum for AM \\nDaily SPF (Pavise) followed by tinted SPF
Render Risk Assessment In Note?: no

## 2024-07-18 NOTE — PROCEDURE: VBEAM PERFECTA LASER
Spot Size: 10 mm
Consent: Written consent obtained.  Risks were reviewed including but not limited to crusting, scabbing, blistering, scarring, darker or lighter pigmentary change, bruising, and/or incomplete response.
Spray Time (Ms): 0
Price (Use Numbers Only, No Special Characters Or $): 500
Is There A Fourth Setting?: no
Post-Procedure: Following the procedure the post care instructions were reviewed with the patient.
Pulse Duration: 10 ms
Fluence (J/Cm2): 6.0
Location: cheeks, nose, forehead
Pre-Procedure: The treatment areas identified by the patient were cleansed and treatment was performed with the parameters mentioned above.
Is There A Second Setting?: yes
Detail Level: Zone
Treatment Number: 2
Post-Care Instructions: I reviewed with the patient in detail post-care instructions.  Cool compresses or cold gel packs may be applied after treatment.  Exposure to the sun should be avoided and sun protection and sunscreen should be used.
Fluence (J/Cm2): 7.0

## 2024-09-04 NOTE — PROCEDURE: PULSED-DYE LASER
Location (Required For Details To Render In Note): full face
Medicare Annual Wellness Visit    Lima Jc is here for Medicare AWV (Pt states that she is here for an AWV, is not fasting for bw)    Assessment & Plan   Medicare annual wellness visit, subsequent    Recommendations for Preventive Services Due: see orders and patient instructions/AVS.  Recommended screening schedule for the next 5-10 years is provided to the patient in written form: see Patient Instructions/AVS.     Return in 1 year (on 9/4/2025) for Medicare AWV.     Subjective       Patient's complete Health Risk Assessment and screening values have been reviewed and are found in Flowsheets. The following problems were reviewed today and where indicated follow up appointments were made and/or referrals ordered.    Positive Risk Factor Screenings with Interventions:                  Abnormal BMI (obese):  Body mass index is 33 kg/m². (!) Abnormal  Interventions:  Continue regular exercise regimen, continue working on heart healthy lifestyle                           Objective   Vitals:    09/04/24 1140   BP: 124/72   Pulse: (!) 104   SpO2: 98%   Weight: 81.8 kg (180 lb 6.4 oz)   Height: 1.575 m (5' 2\")      Body mass index is 33 kg/m².      General Appearance: alert and oriented to person, place and time, well developed and well- nourished, in no acute distress  Skin: warm and dry, no rash or erythema  Head: normocephalic and atraumatic  Eyes: extraocular eye movements intact, conjunctivae normal    Neck: supple and non-tender without mass, no thyromegaly or thyroid nodules, no cervical lymphadenopathy  Pulmonary/Chest: clear to auscultation bilaterally- no wheezes, rales or rhonchi, normal air movement, no respiratory distress  Cardiovascular: normal rate, regular rhythm, normal S1 and S2, no murmurs, rubs, clicks, or gallops,  no carotid bruits  Abdomen: soft, non-tender, non-distended, normal bowel sounds, no masses or organomegaly  Extremities: no cyanosis, clubbing or edema  Musculoskeletal:  no joint 
Delay Time (Ms): 10
Cryogen Time (Ms): [address] [address]
Delay Time (Ms): 4[address] [facility]
Post-Care Instructions: I reviewed with the patient in detail post-care instructions. Patient should stay away from the sun and wear sun protection until treated areas are fully healed.
Price (Use Numbers Only, No Special Characters Or $): 500.00
Spot Size: 7 mm
Detail Level: Detailed
Treated Area: small area
Laser Type: Vbeam 595nm
Delay Time (Ms): 20
Cryogen Time (Ms): 30
Pulse Duration: 10 ms
Spot Size: 10 mm
Fluence In J/Cm2 (Optional): 8:00
Location (Required For Details To Render In Note But Body Touch Will Also Count For First Location): mid face
Pulse Duration: 20 ms
Fluence In J/Cm2 (Optional): 9
Immediate Endpoint: erythema
Consent: Written consent obtained, risks reviewed including but not limited to crusting, scabbing, blistering, scarring, darker or lighter pigmentary change, incidental hair removal, bruising, and/or incomplete removal.
Post-Procedure Care: Vaseline and ice applied. Post care reviewed with patient.
Statement Selected